# Patient Record
Sex: MALE | URBAN - METROPOLITAN AREA
[De-identification: names, ages, dates, MRNs, and addresses within clinical notes are randomized per-mention and may not be internally consistent; named-entity substitution may affect disease eponyms.]

---

## 2017-02-10 ENCOUNTER — IMPORTED ENCOUNTER (OUTPATIENT)
Dept: URBAN - METROPOLITAN AREA CLINIC 1 | Facility: CLINIC | Age: 52
End: 2017-02-10

## 2017-02-10 PROBLEM — H52.4: Noted: 2017-02-10

## 2017-02-10 PROCEDURE — S0620 ROUTINE OPHTHALMOLOGICAL EXA: HCPCS

## 2017-02-10 NOTE — PATIENT DISCUSSION
1.  Presbyopia: Rx was given for corrective spectacles if indicated. 2.  ROBERT-OU-Gave patient a sample of Refresh Tears BID OU 3. Return for an appointment in 1 year for 40. with Dr. Philippe Miguel.

## 2017-12-06 NOTE — PATIENT DISCUSSION
Patient told to monitor closely for any changes in pupil size, vision changes, diplopia, s/s of stroke.

## 2020-01-08 NOTE — PATIENT DISCUSSION
Recommend Bilateral lower lid blepharoplasty trans conj laser (discussed risks and benefits of sx. ..) malar bagging R>L.

## 2022-04-02 ASSESSMENT — VISUAL ACUITY
OD_SC: 20/20
OS_CC: J1+
OD_CC: J1+
OS_SC: 20/20

## 2022-04-02 ASSESSMENT — TONOMETRY
OS_IOP_MMHG: 15
OD_IOP_MMHG: 15